# Patient Record
Sex: FEMALE | Race: OTHER | NOT HISPANIC OR LATINO
[De-identification: names, ages, dates, MRNs, and addresses within clinical notes are randomized per-mention and may not be internally consistent; named-entity substitution may affect disease eponyms.]

---

## 2019-09-21 ENCOUNTER — TRANSCRIPTION ENCOUNTER (OUTPATIENT)
Age: 39
End: 2019-09-21

## 2019-11-01 PROBLEM — Z00.00 ENCOUNTER FOR PREVENTIVE HEALTH EXAMINATION: Status: ACTIVE | Noted: 2019-11-01

## 2019-11-02 LAB — CYTOLOGY CVX/VAG DOC THIN PREP: NORMAL

## 2019-11-05 ENCOUNTER — APPOINTMENT (OUTPATIENT)
Dept: OBGYN | Facility: CLINIC | Age: 39
End: 2019-11-05
Payer: COMMERCIAL

## 2019-11-05 DIAGNOSIS — Z86.19 PERSONAL HISTORY OF OTHER INFECTIOUS AND PARASITIC DISEASES: ICD-10-CM

## 2019-11-05 DIAGNOSIS — Z87.59 PERSONAL HISTORY OF OTHER COMPLICATIONS OF PREGNANCY, CHILDBIRTH AND THE PUERPERIUM: ICD-10-CM

## 2019-11-05 DIAGNOSIS — Z86.2 PERSONAL HISTORY OF DISEASES OF THE BLOOD AND BLOOD-FORMING ORGANS AND CERTAIN DISORDERS INVOLVING THE IMMUNE MECHANISM: ICD-10-CM

## 2019-11-05 DIAGNOSIS — Z87.42 PERSONAL HISTORY OF OTHER DISEASES OF THE FEMALE GENITAL TRACT: ICD-10-CM

## 2019-11-05 DIAGNOSIS — D25.9 LEIOMYOMA OF UTERUS, UNSPECIFIED: ICD-10-CM

## 2019-11-05 DIAGNOSIS — N83.291 OTHER OVARIAN CYST, RIGHT SIDE: ICD-10-CM

## 2019-11-05 DIAGNOSIS — Z86.018 PERSONAL HISTORY OF OTHER BENIGN NEOPLASM: ICD-10-CM

## 2019-11-26 ENCOUNTER — APPOINTMENT (OUTPATIENT)
Dept: OBGYN | Facility: CLINIC | Age: 39
End: 2019-11-26
Payer: COMMERCIAL

## 2019-11-26 VITALS
SYSTOLIC BLOOD PRESSURE: 134 MMHG | WEIGHT: 188 LBS | DIASTOLIC BLOOD PRESSURE: 90 MMHG | BODY MASS INDEX: 28.49 KG/M2 | HEIGHT: 68 IN

## 2019-11-26 PROCEDURE — 99396 PREV VISIT EST AGE 40-64: CPT

## 2021-03-22 ENCOUNTER — APPOINTMENT (OUTPATIENT)
Dept: OBGYN | Facility: CLINIC | Age: 41
End: 2021-03-22
Payer: COMMERCIAL

## 2021-03-22 VITALS
TEMPERATURE: 98 F | SYSTOLIC BLOOD PRESSURE: 122 MMHG | DIASTOLIC BLOOD PRESSURE: 76 MMHG | WEIGHT: 175 LBS | BODY MASS INDEX: 26.61 KG/M2

## 2021-03-22 DIAGNOSIS — Z01.419 ENCOUNTER FOR GYNECOLOGICAL EXAMINATION (GENERAL) (ROUTINE) W/OUT ABNORMAL FINDINGS: ICD-10-CM

## 2021-03-22 PROCEDURE — 99396 PREV VISIT EST AGE 40-64: CPT

## 2021-03-22 PROCEDURE — 99072 ADDL SUPL MATRL&STAF TM PHE: CPT

## 2021-03-22 NOTE — HISTORY OF PRESENT ILLNESS
[FreeTextEntry1] : Patient is 40 years old para 3-0-0-3 last menstrual period December 2018 at which time she underwent a supracervical hysterectomy bilateral salpingectomy secondary to symptomatic fibroids.\par She is presently without complaints

## 2021-03-22 NOTE — DISCUSSION/SUMMARY
[FreeTextEntry1] : Pap done\par Self breast exam stressed\par Prescribed bilateral screening mammogram and bilateral breast ultrasound\par Follow-up yearly or as needed

## 2021-06-28 ENCOUNTER — APPOINTMENT (OUTPATIENT)
Dept: BREAST CENTER | Facility: CLINIC | Age: 41
End: 2021-06-28
Payer: COMMERCIAL

## 2021-06-28 VITALS
BODY MASS INDEX: 26.68 KG/M2 | WEIGHT: 170 LBS | DIASTOLIC BLOOD PRESSURE: 80 MMHG | HEIGHT: 67 IN | SYSTOLIC BLOOD PRESSURE: 124 MMHG | TEMPERATURE: 97.9 F

## 2021-06-28 DIAGNOSIS — Z80.3 FAMILY HISTORY OF MALIGNANT NEOPLASM OF BREAST: ICD-10-CM

## 2021-06-28 DIAGNOSIS — Z86.79 PERSONAL HISTORY OF OTHER DISEASES OF THE CIRCULATORY SYSTEM: ICD-10-CM

## 2021-06-28 PROCEDURE — 99203 OFFICE O/P NEW LOW 30 MIN: CPT

## 2021-06-28 PROCEDURE — 99072 ADDL SUPL MATRL&STAF TM PHE: CPT

## 2021-06-28 NOTE — PHYSICAL EXAM
[Normocephalic] : normocephalic [Atraumatic] : atraumatic [EOMI] : extra ocular movement intact [No Supraclavicular Adenopathy] : no supraclavicular adenopathy [No Cervical Adenopathy] : no cervical adenopathy [Examined in the supine and seated position] : examined in the supine and seated position [Symmetrical] : symmetrical [No dominant masses] : no dominant masses in right breast  [No dominant masses] : no dominant masses left breast [No Nipple Retraction] : no left nipple retraction [No Nipple Discharge] : no left nipple discharge [No Axillary Lymphadenopathy] : no left axillary lymphadenopathy [Soft] : abdomen soft [Not Tender] : non-tender [No Edema] : no edema [No Rashes] : no rashes [No Ulceration] : no ulceration [de-identified] : bilateral nondiscrete nodularities palpated throughout both breasts, but no suspicious masses were palpated bilaterally

## 2021-06-28 NOTE — ASSESSMENT
[FreeTextEntry1] : Brandy is a 40 F who presents with b/l BIRADS 3 abnormalities. \par \par On exam, she had b/l nondiscrete nodularities palpated throughout both breasts, but no suspicious abnormalities were palpated b/l. \par \par Her most recent imaging was a b/l screening mammogram and US and subsequent R dx mammogram and b/l US on 4/20/2021 and 4/23/2021 which revealed in her right breast, a well circumscribed 1 cm asymmetry correlating with US finding @1N8, a 7 x 7 x 10 mm nodule, deemed BIRADS 3.  She was also found to have a L breast cluster of cysts @10N5, measuring 9 x 3 x 8 mm, also deemed BIRADS 3. \par \par She will be due for a R dx mammogram and b/l US on 10/23/2021.  This will be scheduled for her today.  I will have her follow up after for a CBE. \par \par We also discussed BIRADS 3 lesions.  These lesions have a 2% chance of harboring malignancy.  There is always an option to obtain a tissue sample with a biopsy to confirm the diagnosis.   The procedure was described in detail including the placement of a tissue marker clip.  The risks of the procedure, including but not limited to bleeding and infection were also explained.  She is not interested in biopsy at this time and agrees to continue with short-term interval imaging.\par \par We discussed dense breasts.  Increasing breast density has been found to increase ones risk of breast cancer, but at this time, there is no clear indication for additional imaging in this setting, as both US and MRI have not been found to improve survival.  One can consider bilateral screening US.  However, out of 1000 women screened, the use of routine US will only identify an additional 3-5 cancers.  The use of US was found to increase the likelihood of undergoing more imaging and more biopsies.  She does have dense breasts.  We have decided to proceed with screening bilateral breast US at this time.  This will be scheduled with her next screening mammogram.\par \par She is otherwise at an average risk for breast cancer and should continue with annual screening mammograms and US. \par \par All of her questions were answered.  She knows to call with any further questions or concerns. \par \par PLAN: \par -R dx mammogram and b/l US on 10/23/2021 \par -f/up after

## 2021-06-28 NOTE — DATA REVIEWED
[FreeTextEntry1] : Procedure(s) \par Accession Number(s)\par Date of Service\par MAMMO SCREENING BILATERAL\par US BREAST\par [id number]\par [id number]\par 4/20/21\par 4/20/21\par  \par  \par  \par     \par OVERALL IMPRESSION: OVERALL BI-RADS 0: INCOMPLETE\par  \par Indeterminant mass in the right breast at 1:00 location 4 cm from the nipple likely corresponding to the mammographic abnormality. Targeted ultrasound is recommended for further evaluation.\par  \par Indeterminant mass in the left breast at 10:00 location 5 cm from the nipple. Targeted ultrasound is recommended for further evaluation.\par  \par An additional imaging exam of both breast(s) is recommended. The patient will be sent a letter to return for additional imaging.\par  \par  \par FINDINGS:\par  \par MAMMO TOMOSYNTHESIS SCREENING BILATERAL, US BREAST COMPLETE BILATERAL\par  \par Clinical Breast Exam: Patient does report clinical breast exam in the last year.\par  \par Clinical Indication: Routine screening.\par  \par Compared to: 03/14/2020, 12/08/2018, and 03/12/2016\par  \par MAMMOGRAM: \par  \par Tomosynthesis and 2D imaging of the breast(s) were performed.  Current study was also evaluated with a computer aided detection (CAD) system.\par  \par Breast density: There are scattered areas of fibroglandular density.\par  \par No significant masses, calcifications, or other findings are seen in the left breast. In the right breast at 12-1 o'clock location there is a mass measuring 1.3 cm.\par  \par Mammo BI-RADS 0: INCOMPLETE - NEED ADDITIONAL IMAGING EVALUATION\par  \par  \par US BREAST COMPLETE BILATERAL\par  \par Ultrasound evaluation was performed including examination of all four quadrants of the breast(s) and the retroareolar region(s).\par  \par In the right breast at 1:00 location 4 cm from the nipple likely corresponding to the mammographic abnormality there is a hypoechoic mass measuring 0.7 x 0.7 x 1.0 cm. Targeted ultrasound is recommended for further evaluation.\par In the left breast at 10:00 location 5 cm from the nipple there is a hypoechoic mass measuring 0.8 x 0.2 x 0.8 cm. Targeted ultrasound is recommended for further evaluation.\par  \par Ultrasound BI-RADS 0: INCOMPLETE\par  \par  \par Electronic Signature: I personally reviewed the images and agree with this report. Final Report: Dictated by  and Signed by Attending Jennifer Bush MD 4/20/2021 10:46 AM\par \par \par Procedure(s) \par Accession Number(s)\par Date of Service\par US BREAST\par MAMMO CALLBACK FROM SCREENING RIGHT\par [id number]\par [id number]\par 4/23/21\par 4/23/21\par  \par  \par  \par     \par OVERALL IMPRESSION: \par  \par Probably benign asymmetry in the right breast as above. Follow-up right breast mammogram and ultrasound is advised.\par  \par Cluster of cysts in the left breast at 10:00 axis. Six-month follow-up targeted breast ultrasound is advised.\par  \par As further detailed above, a 6 month follow-up DIAGNOSTIC imaging exam of both breast(s) is recommended. A letter will be sent to the patient to return for follow up.\par  \par The findings and recommendations were discussed with the patient.\par  \par BI-RADS 3: PROBABLY BENIGN\par  \par  \par FINDINGS:\par  \par MAMMO TOMOSYNTHESIS CALLBACK FROM SCREENING RIGHT, US BREAST\par  \par Clinical Breast Exam: Patient does report clinical breast exam in the last year.\par  \par Clinical Indication: Abnormal Findings on Mammogram. Family history of paternal grandmother with breast cancer.\par  \par Compared to: 04/20/2021, 03/14/2020, 12/08/2018, and 03/12/2016\par  \par MAMMOGRAM: \par  \par Tomosynthesis and 2D imaging of the breast(s) were performed.  Current study was also evaluated with a computer aided detection (CAD) system.\par  \par Breast Density: Heterogeneously dense, which may obscure small masses.\par  \par There is a focal 1 cm well-circumscribed asymmetry in the right breast at 1:00 axis which likely correlates with ultrasound findings. No other significant masses, calcifications, or other findings are seen in the right breast.\par  \par US BREAST LIMITED BILATERAL\par  \par Color flow, Gray scale and real-time ultrasound of both breasts was performed and targeted to the area(s) of interest.\par  \par  \par There is a nodule in the right breast at 1:00 axis at 8 cm from nipple (previously labeled as being at 1:00 axis at 4 cm from the nipple) measuring 7 x 7 x 10 mm. It likely correlates with the focal asymmetry visualized mammographically. A six-month follow-up diagnostic mammogram and ultrasound is advised.\par  \par There is a cluster of cysts visualized in left breast at 10:00 axis at 5 cm from nipple measuring 9 x 3 x 8 mm. Six-month follow-up targeted breast ultrasound is advised.\par  \par No other findings were seen sonographically in either imaged breast.\par  \par Electronic Signature: I personally reviewed the images and agree with this report. Final Report: Dictated by  and Signed by Attending Layne Garcia MD 4/23/2021 12:57 PM

## 2021-06-28 NOTE — REVIEW OF SYSTEMS
[Abn Vaginal Bleeding] : unexplained vaginal bleeding [As Noted in HPI] : as noted in HPI [Negative] : Heme/Lymph [Fever] : no fever [Chills] : no chills [Skin Lesions] : no skin lesions [Skin Wound] : no skin wound [Breast Pain] : no breast pain [Breast Lump] : no breast lump [Hot Flashes] : no hot flashes

## 2021-06-28 NOTE — HISTORY OF PRESENT ILLNESS
[FreeTextEntry1] : Brandy is a 40 F who presents with b/l BIRADS 3 abnormalities. \par \par She has no breast related complaints at this time.  She denies any breast pain, has not palpated any new palpable masses in either breast and denies any nipple discharge or retraction.  \par \par Her work up was as follows: \par 2021 -- b/l screening mammogram and US \par -scattered areas of fibroglandular densities\par -no suspicious mass, microcalcifications or architectural distortion in L \par -R: @12-1, mass measuring 1.3 cm \par B/l US \par RIGHT \par -@1N4, hypoechoic mass, measures 7 x 7 x 10 mm \par LEFT: \par -@10N15, hypoechoic mass, measures 8 x 2 x 8 mm \par BIRADS 0 \par \par 2021 -- R dx mammogram and b/l US \par -heterogenously dense breasts\par -focal 1 cm well circumscribed asymmetry in R @1:00, likely correlates with US \par -no suspicious mass, microcalcifications or architectural distortion in R \par b/l US \par RIGHT: \par -@1N8, previously labeled @1N4, 7 x 7 x 10 mm nodule --> BIRADS 3 \par LEFT: \par -@10N5, cluster of cysts, measures 9 x 3 x 8 mm --> BIRADS 3 \par BIRADS 3 \par \par HISTORICAL RISK FACTORS: \par -no prior breast biopsies or surgeries \par -family history of breast cancer in a paternal grandmother at age 80 \par -, age at first live birth was 15 \par -prior OCP use, stopped in  \par -s/p hysterectomy in

## 2021-06-28 NOTE — PAST MEDICAL HISTORY
[Surgical Menopause] : The patient is in surgical menopause [Menarche Age ____] : age at menarche was [unfilled] [History of Hormone Replacement Treatment] : has no history of hormone replacement treatment [Total Preg ___] : G[unfilled] [Live Births ___] : P[unfilled]  [Age At Live Birth ___] : Age at live birth: [unfilled] [FreeTextEntry5] : s/p hysterectomy in  2017  [FreeTextEntry6] : denies [FreeTextEntry7] : yes in past, stopped in 2012  [FreeTextEntry8] : yes x 4 months

## 2021-10-28 ENCOUNTER — APPOINTMENT (OUTPATIENT)
Dept: BREAST CENTER | Facility: CLINIC | Age: 41
End: 2021-10-28

## 2021-11-16 ENCOUNTER — NON-APPOINTMENT (OUTPATIENT)
Age: 41
End: 2021-11-16

## 2021-12-03 ENCOUNTER — TRANSCRIPTION ENCOUNTER (OUTPATIENT)
Age: 41
End: 2021-12-03

## 2021-12-09 ENCOUNTER — APPOINTMENT (OUTPATIENT)
Dept: BREAST CENTER | Facility: CLINIC | Age: 41
End: 2021-12-09

## 2022-05-10 ENCOUNTER — APPOINTMENT (OUTPATIENT)
Dept: OBGYN | Facility: CLINIC | Age: 42
End: 2022-05-10
Payer: COMMERCIAL

## 2022-05-10 VITALS — WEIGHT: 181 LBS | DIASTOLIC BLOOD PRESSURE: 80 MMHG | BODY MASS INDEX: 28.35 KG/M2 | SYSTOLIC BLOOD PRESSURE: 122 MMHG

## 2022-05-10 DIAGNOSIS — Z01.419 ENCOUNTER FOR GYNECOLOGICAL EXAMINATION (GENERAL) (ROUTINE) W/OUT ABNORMAL FINDINGS: ICD-10-CM

## 2022-05-10 PROCEDURE — 99396 PREV VISIT EST AGE 40-64: CPT

## 2022-05-10 NOTE — HISTORY OF PRESENT ILLNESS
[FreeTextEntry1] : Patient is 41 years old para 3-0-0-3 last menstrual period December 2018 at which time she underwent a supracervical hysterectomy bilateral salpingectomy secondary to symptomatic fibroids.\par She is presently without complaints.

## 2023-06-01 ENCOUNTER — APPOINTMENT (OUTPATIENT)
Dept: OBGYN | Facility: CLINIC | Age: 43
End: 2023-06-01
Payer: COMMERCIAL

## 2023-06-01 VITALS
BODY MASS INDEX: 30.29 KG/M2 | HEIGHT: 67 IN | DIASTOLIC BLOOD PRESSURE: 76 MMHG | SYSTOLIC BLOOD PRESSURE: 120 MMHG | WEIGHT: 193 LBS

## 2023-06-01 DIAGNOSIS — Z78.9 OTHER SPECIFIED HEALTH STATUS: ICD-10-CM

## 2023-06-01 DIAGNOSIS — Z01.419 ENCOUNTER FOR GYNECOLOGICAL EXAMINATION (GENERAL) (ROUTINE) W/OUT ABNORMAL FINDINGS: ICD-10-CM

## 2023-06-01 PROCEDURE — 99396 PREV VISIT EST AGE 40-64: CPT

## 2023-06-01 NOTE — REASON FOR VISIT
Requested Prescriptions     Pending Prescriptions Disp Refills    lisinopril (PRINIVIL, ZESTRIL) 20 mg tablet 90 Tab 3     Sig: Take 1 Tab by mouth daily. [Annual] : an annual visit.

## 2023-06-01 NOTE — HISTORY OF PRESENT ILLNESS
[FreeTextEntry1] : Patient is 42 years old para 3-0-0-3 last menstrual period December 2018 at which time she underwent a supracervical hysterectomy bilateral salpingectomy secondary to symptomatic fibroids.\par She is presently without complaints.

## 2023-08-31 ENCOUNTER — APPOINTMENT (OUTPATIENT)
Dept: BREAST CENTER | Facility: CLINIC | Age: 43
End: 2023-08-31
Payer: COMMERCIAL

## 2023-08-31 VITALS
BODY MASS INDEX: 30.13 KG/M2 | SYSTOLIC BLOOD PRESSURE: 121 MMHG | WEIGHT: 192 LBS | HEIGHT: 67 IN | DIASTOLIC BLOOD PRESSURE: 76 MMHG

## 2023-08-31 DIAGNOSIS — R92.8 OTHER ABNORMAL AND INCONCLUSIVE FINDINGS ON DIAGNOSTIC IMAGING OF BREAST: ICD-10-CM

## 2023-08-31 DIAGNOSIS — N63.10 UNSPECIFIED LUMP IN THE RIGHT BREAST, UNSPECIFIED QUADRANT: ICD-10-CM

## 2023-08-31 DIAGNOSIS — N63.20 UNSPECIFIED LUMP IN THE LEFT BREAST, UNSPECIFIED QUADRANT: ICD-10-CM

## 2023-08-31 PROCEDURE — 99214 OFFICE O/P EST MOD 30 MIN: CPT

## 2023-08-31 NOTE — PHYSICAL EXAM
[Normocephalic] : normocephalic [EOMI] : extra ocular movement intact [No Supraclavicular Adenopathy] : no supraclavicular adenopathy [No Cervical Adenopathy] : no cervical adenopathy [Examined in the supine and seated position] : examined in the supine and seated position [No dominant masses] : no dominant masses in right breast  [No dominant masses] : no dominant masses left breast [No Nipple Retraction] : no left nipple retraction [No Nipple Discharge] : no left nipple discharge [Breast Nipple Inversion] : nipples not inverted [Breast Nipple Retraction] : nipples not retracted [No Axillary Lymphadenopathy] : no left axillary lymphadenopathy [No Rashes] : no rashes [No Ulceration] : no ulceration [de-identified] : Nl respirations

## 2023-08-31 NOTE — ASSESSMENT
[FreeTextEntry1] : Patient is a 42F with BIRADS 3 imaging.  She initially underwent imaging in 4/2021 that was BIRADS 3 with bilateral breast masses, likely cysts. She most recently had bilateral mmg/us in 6/2023 which showed bilateral stable breast masses (BIRADS 3).  I had a lengthy discussion with this patient regarding diagnostic results, impressions, recommendations, risks and benefits.  We discussed breast cysts. They are not pre-malignant nor do they have malignant potential and are hormonally influenced. They may grow or shrink in size as well as resolve spontaneously and there is usually no intervention unless they are symptomatic. In several large studies, patients with breast cysts and a positive family history had a higher relative risk of breast cancer (from 1.5 to 3).  I explained to the patient the BIRADS system of grading and that her findings fall into category 3. Category 3 carries a less than 5% risk of malignancy. She can choose to follow up imaging. She is also aware that she can also choose to biopsy the lesion if she wishes to. I briefly discussed the procedure will be performed by a breast imaging specialist, and will include numbing with local anesthesia, followed by a small biopsy marker placement afterwards, which is usually not bothersome, and is not recognized by radiofrequency devices.  She understands her options and wants to proceed with imaging follow up.  All questions and concerns were answered in detail.  Patient is for bilateral US in 12/2023 for BIRADS 3 imaging.  She is to follow up after imaging ,pending any interval changes.  Total time spent on encounter was greater than 30 minutes, which included face to face time with the patient, performing an exam, reviewing previous medical records including imaging/ pathology, documenting in patient record and coordinating care/imaging. Greater than 50% of the encounter was spent on counseling and coordination of her breast issue.

## 2023-08-31 NOTE — PAST MEDICAL HISTORY
[Surgical Menopause] : The patient is in surgical menopause [Menarche Age ____] : age at menarche was [unfilled] [Total Preg ___] : G[unfilled] [Live Births ___] : P[unfilled]  [Age At Live Birth ___] : Age at live birth: [unfilled] [History of Hormone Replacement Treatment] : has no history of hormone replacement treatment [FreeTextEntry5] : s/p hysterectomy in  2017  [FreeTextEntry6] : denies [FreeTextEntry7] : yes in past, stopped in 2012  [FreeTextEntry8] : yes x 4 months

## 2023-08-31 NOTE — DATA REVIEWED
[FreeTextEntry1] : Jun 20, 2023	   There is no mammographic evidence of malignancy. Stable bilateral sonographic findings. Both lesions can be reevaluated sonographically in 12 months.   A 12 month follow-up of both breast(s) is recommended.    A letter will be sent to the patient to return for follow up.   The findings and recommendations were communicated to the patient.   BI-RADS 3: PROBABLY BENIGN     MAMMO TOMOSYNTHESIS DIAGNOSTIC BILATERAL, US BREAST COMPLETE BILATERAL   Clinical Breast Exam: Patient does report clinical breast exam in the last year.   Clinical Indication: Family history of paternal grandmother with breast cancer. Patient has history of benign biopsy/biopsies. Patient in for annual follow up. Patient has had a benign surgical excision in the left breast.   Compared to: 05/19/2022, 04/20/2021, 03/14/2020   MAMMOGRAM:    Tomosynthesis 3D and 2D imaging of the breast(s) were performed.  Current study was also evaluated with a computer aided detection (CAD) system.   Breast Density: Heterogeneously dense, which may obscure small masses.   No suspicious masses, calcifications, or other findings are seen.   US BREAST COMPLETE BILATERAL   Ultrasound evaluation was performed including examination of all four quadrants of the breast(s) and the retroareolar regions.   Color flow, Gray scale and real-time ultrasound of both breasts was performed.    Stable bilateral sonographic findings. Both lesions can be reevaluated sonographically in 12 months. No suspicious abnormalities were seen sonographically.

## 2023-08-31 NOTE — HISTORY OF PRESENT ILLNESS
[FreeTextEntry1] : Brandy is a 42 F who presents with b/l BIRADS 3 abnormalities.   HISTORICAL RISK FACTORS:  -family history of breast cancer in a paternal grandmother at age 80  -, age at first live birth was 15  -prior OCP use, stopped in   -s/p hysterectomy in 2017   Her work up was as follows:  2021 -- b/l screening mammogram and US  -scattered areas of fibroglandular densities -no suspicious mass, microcalcifications or architectural distortion in L  -R: @12-1, mass measuring 1.3 cm  B/l US  RIGHT  -@1N4, hypoechoic mass, measures 7 x 7 x 10 mm  LEFT:  -@10N15, hypoechoic mass, measures 8 x 2 x 8 mm  BIRADS 0   2021 -- R dx mammogram and b/l US --> BIRADS 3 -heterogenously dense breasts -focal 1 cm well circumscribed asymmetry in R @1:00, likely correlates with US  -no suspicious mass, microcalcifications or architectural distortion in R  b/l US  RIGHT:  -@1N8, previously labeled @1N4, 7 x 7 x 10 mm nodule --> BIRADS 3  LEFT:  -@10N5, cluster of cysts, measures 9 x 3 x 8 mm --> BIRADS 3   Her most recent imaging is as follows:  23 b/l dx mammo and US-->BIRADS 3 -Breast Density: Heterogeneously dense, -Stable bilateral sonographic findings.  Denies any current complaints. No new changes to her breasts.

## 2024-07-24 ENCOUNTER — APPOINTMENT (OUTPATIENT)
Dept: OBGYN | Facility: CLINIC | Age: 44
End: 2024-07-24
Payer: COMMERCIAL

## 2024-07-24 VITALS — DIASTOLIC BLOOD PRESSURE: 73 MMHG | HEART RATE: 75 BPM | SYSTOLIC BLOOD PRESSURE: 138 MMHG

## 2024-07-24 DIAGNOSIS — Z01.419 ENCOUNTER FOR GYNECOLOGICAL EXAMINATION (GENERAL) (ROUTINE) W/OUT ABNORMAL FINDINGS: ICD-10-CM

## 2024-07-24 PROCEDURE — 99459 PELVIC EXAMINATION: CPT

## 2024-07-24 PROCEDURE — 99396 PREV VISIT EST AGE 40-64: CPT | Mod: 25

## 2024-07-24 NOTE — PHYSICAL EXAM
[Chaperone Present] : A chaperone was present in the examining room during all aspects of the physical examination [[id number]] : A chaperone was present during the pelvic exam.

## 2024-08-12 ENCOUNTER — APPOINTMENT (OUTPATIENT)
Dept: OBGYN | Facility: CLINIC | Age: 44
End: 2024-08-12
Payer: COMMERCIAL

## 2024-08-12 VITALS
SYSTOLIC BLOOD PRESSURE: 143 MMHG | DIASTOLIC BLOOD PRESSURE: 92 MMHG | HEIGHT: 67 IN | BODY MASS INDEX: 30.13 KG/M2 | HEART RATE: 78 BPM | WEIGHT: 192 LBS

## 2024-08-12 DIAGNOSIS — R87.615 UNSATISFACTORY CYTOLOGIC SMEAR OF CERVIX: ICD-10-CM

## 2024-08-12 PROCEDURE — XXXXX: CPT

## 2024-08-12 NOTE — HISTORY OF PRESENT ILLNESS
[FreeTextEntry1] : Patient is 43 years old para 3-0-2-3 last menstrual period 2018 She has a history of supracervical hysterectomy Pap performed on July 24, 2024 noted endocervical/transformation zone component absent Patient returns for repeat Pap

## 2024-08-12 NOTE — PHYSICAL EXAM
[Chaperone Present] : A chaperone was present in the examining room during all aspects of the physical examination [FreeTextEntry2] : SH [Appropriately responsive] : appropriately responsive [Alert] : alert [No Acute Distress] : no acute distress [No Lymphadenopathy] : no lymphadenopathy [Regular Rate Rhythm] : regular rate rhythm [No Murmurs] : no murmurs [Clear to Auscultation B/L] : clear to auscultation bilaterally [Soft] : soft [Non-tender] : non-tender [Non-distended] : non-distended [No HSM] : No HSM [No Lesions] : no lesions [No Mass] : no mass [Oriented x3] : oriented x3 [Labia Majora] : normal [Labia Minora] : normal [Normal] : normal [Absent] : absent [Uterine Adnexae] : non-palpable

## 2024-09-16 ENCOUNTER — APPOINTMENT (OUTPATIENT)
Dept: BREAST CENTER | Facility: CLINIC | Age: 44
End: 2024-09-16
Payer: COMMERCIAL

## 2024-09-16 VITALS
DIASTOLIC BLOOD PRESSURE: 91 MMHG | SYSTOLIC BLOOD PRESSURE: 160 MMHG | WEIGHT: 192 LBS | BODY MASS INDEX: 30.13 KG/M2 | HEIGHT: 67 IN

## 2024-09-16 DIAGNOSIS — N60.11 DIFFUSE CYSTIC MASTOPATHY OF RIGHT BREAST: ICD-10-CM

## 2024-09-16 DIAGNOSIS — N63.20 UNSPECIFIED LUMP IN THE LEFT BREAST, UNSPECIFIED QUADRANT: ICD-10-CM

## 2024-09-16 DIAGNOSIS — N63.10 UNSPECIFIED LUMP IN THE RIGHT BREAST, UNSPECIFIED QUADRANT: ICD-10-CM

## 2024-09-16 DIAGNOSIS — N60.12 DIFFUSE CYSTIC MASTOPATHY OF RIGHT BREAST: ICD-10-CM

## 2024-09-16 PROCEDURE — 99214 OFFICE O/P EST MOD 30 MIN: CPT

## 2024-09-17 PROBLEM — N60.11 BILATERAL FIBROCYSTIC BREAST CHANGES: Status: ACTIVE | Noted: 2024-09-17

## 2024-09-17 NOTE — HISTORY OF PRESENT ILLNESS
[FreeTextEntry1] : Brandy is a 43 F who previous BIRADs 3 findings, now deemed BIRADS 2.  HISTORICAL RISK FACTORS:  -family history of breast cancer in a paternal grandmother at age 80  -, age at first live birth was 15  -prior OCP use, stopped in   -s/p hysterectomy in 2017   Her work up was as follows:  2021 -- b/l screening mammogram and US  -scattered areas of fibroglandular densities -no suspicious mass, microcalcifications or architectural distortion in L  -R: @12-1, mass measuring 1.3 cm  B/l US  RIGHT  -@1N4, hypoechoic mass, measures 7 x 7 x 10 mm  LEFT:  -@10N15, hypoechoic mass, measures 8 x 2 x 8 mm  BIRADS 0   2021 -- R dx mammogram and b/l US --> BIRADS 3 -heterogenously dense breasts -focal 1 cm well circumscribed asymmetry in R @1:00, likely correlates with US  -no suspicious mass, microcalcifications or architectural distortion in R  b/l US  RIGHT:  -@1N8, previously labeled @1N4, 7 x 7 x 10 mm nodule --> BIRADS 3  LEFT:  -@10N5, cluster of cysts, measures 9 x 3 x 8 mm --> BIRADS 3   Her most recent imaging is as follows:  23 b/l dx mammo and US-->BIRADS 3 -Breast Density: Heterogeneously dense, -Stable bilateral sonographic findings.  Denies any current complaints. No new changes to her breasts.   Most recent imaging: B/L Dx Mammo & Sono - 2024: -Breast Density: There are scattered areas of fibroglandular density. -No suspicious masses, calcifications, or other findings are seen. US BREAST COMPLETE BILATERAL -No suspicious abnormalities were seen sonographically.  -In the right breast at 1:00 location 4 cm from nipple there is a stable benign mass measuring 1.0 cm.  -The previously seen mass in the right breast at 11:00 location 9 cm from the nipple is not visualized on today's exam.  -In the left breast at 10:00 location 5 cm from the nipple there is a stable benign mass measuring 0.7 cm. BI-RADS 2: BENIGN  Denies any current complaints. No new changes to her breasts.

## 2024-09-17 NOTE — ASSESSMENT
[FreeTextEntry1] : Patient is a 42F with BIRADS 3 imaging, now BIRADS 2.  She initially underwent imaging in 4/2021 that was BIRADS 3 with bilateral breast masses, likely cysts. She most recently had bilateral mmg/us in 6/2023 which showed bilateral stable breast masses (BIRADS 3).  She had bilateral mmg/us in 8/2024 which showed that the previously seen right breast mass is no longer seen (BIRADS 2).  I had a lengthy discussion with this patient regarding diagnostic results, impressions, recommendations, risks and benefits.  We discussed breast cysts. They are not pre-malignant nor do they have malignant potential and are hormonally influenced. They may grow or shrink in size as well as resolve spontaneously and there is usually no intervention unless they are symptomatic. In several large studies, patients with breast cysts and a positive family history had a higher relative risk of breast cancer (from 1.5 to 3).  We discussed that the previously seen right breast mass is no longer seen and that she will be due for annual imaging.  We discussed breast density. Increasing breast density has been found to increase ones risk of breast cancer, but at this time, there is no clear indication for additional imaging in this setting, as both US and MRI have not been found to improve survival. One can consider bilateral screening US. However, out of 1000 women screened, the use of routine US will only identify an additional 3-5 cancers. The use of US was found to increase the likelihood of undergoing more imaging and more biopsies. She wishes to proceed with screening ultrasounds at this time.  All questions and concerns were answered in detail.  Patient is for bilateral mmg/us in 8/2025.  She is to follow up after imaging, pending any interval changes.  Total time spent on encounter was greater than 30 minutes, which included face to face time with the patient, performing an exam, reviewing previous medical records including imaging/ pathology, documenting in patient record and coordinating care/imaging. Greater than 50% of the encounter was spent on counseling and coordination of her breast issue.

## 2024-09-17 NOTE — PHYSICAL EXAM
[Normocephalic] : normocephalic [EOMI] : extra ocular movement intact [No Supraclavicular Adenopathy] : no supraclavicular adenopathy [No Cervical Adenopathy] : no cervical adenopathy [Examined in the supine and seated position] : examined in the supine and seated position [No dominant masses] : no dominant masses in right breast  [No dominant masses] : no dominant masses left breast [No Nipple Retraction] : no left nipple retraction [No Nipple Discharge] : no left nipple discharge [No Axillary Lymphadenopathy] : no left axillary lymphadenopathy [No Rashes] : no rashes [No Ulceration] : no ulceration [de-identified] : NL respirations

## 2024-09-17 NOTE — REASON FOR VISIT
[Follow-Up: _____] : a [unfilled] follow-up visit [FreeTextEntry1] : h/o BIRDAS-3 imaging - now deemed BIRADS-2.

## 2024-09-17 NOTE — PHYSICAL EXAM
[Normocephalic] : normocephalic [EOMI] : extra ocular movement intact [No Supraclavicular Adenopathy] : no supraclavicular adenopathy [No Cervical Adenopathy] : no cervical adenopathy [Examined in the supine and seated position] : examined in the supine and seated position [No dominant masses] : no dominant masses in right breast  [No dominant masses] : no dominant masses left breast [No Nipple Retraction] : no left nipple retraction [No Nipple Discharge] : no left nipple discharge [No Axillary Lymphadenopathy] : no left axillary lymphadenopathy [No Rashes] : no rashes [No Ulceration] : no ulceration [de-identified] : NL respirations

## 2024-09-17 NOTE — DATA REVIEWED
[FreeTextEntry1] : B/L Dx Mammo & Sono - 08/22/2024: MAMMOGRAM:    Tomosynthesis 3D and 2D imaging of the breast(s) were performed.  Current study was also evaluated with a computer aided detection (CAD) system.   Breast Density: There are scattered areas of fibroglandular density.   No suspicious masses, calcifications, or other findings are seen.   US BREAST COMPLETE BILATERAL   Ultrasound evaluation was performed including examination of all four quadrants of the breast(s) and the retroareolar regions.   Color flow, Gray scale and real-time ultrasound of both breasts was performed.    No suspicious abnormalities were seen sonographically. In the right breast at 1:00 location 4 cm from nipple there is a stable benign mass measuring 1.0 cm. The previously seen mass in the right breast at 11:00 location 9 cm from the nipple is not visualized on today's exam. In the left breast at 10:00 location 5 cm from the nipple there is a stable benign mass measuring 0.7 cm.  OVERALL IMPRESSION:    No imaging evidence of malignancy on the current exam(s).   A 1 year screening mammogram of both breast(s) is recommended.   The patient will be sent a normal letter.   BI-RADS 2: BENIGN

## 2024-09-17 NOTE — HISTORY OF PRESENT ILLNESS
[FreeTextEntry1] : Brnady is a 43 F who previous BIRADs 3 findings, now deemed BIRADS 2.  HISTORICAL RISK FACTORS:  -family history of breast cancer in a paternal grandmother at age 80  -, age at first live birth was 15  -prior OCP use, stopped in   -s/p hysterectomy in 2017   Her work up was as follows:  2021 -- b/l screening mammogram and US  -scattered areas of fibroglandular densities -no suspicious mass, microcalcifications or architectural distortion in L  -R: @12-1, mass measuring 1.3 cm  B/l US  RIGHT  -@1N4, hypoechoic mass, measures 7 x 7 x 10 mm  LEFT:  -@10N15, hypoechoic mass, measures 8 x 2 x 8 mm  BIRADS 0   2021 -- R dx mammogram and b/l US --> BIRADS 3 -heterogenously dense breasts -focal 1 cm well circumscribed asymmetry in R @1:00, likely correlates with US  -no suspicious mass, microcalcifications or architectural distortion in R  b/l US  RIGHT:  -@1N8, previously labeled @1N4, 7 x 7 x 10 mm nodule --> BIRADS 3  LEFT:  -@10N5, cluster of cysts, measures 9 x 3 x 8 mm --> BIRADS 3   Her most recent imaging is as follows:  21 b/l dx mammo and US-->BIRADS 3 -Breast Density: Heterogeneously dense, -Stable bilateral sonographic findings.  Denies any current complaints. No new changes to her breasts.   Most recent imaging: B/L Dx Mammo & Sono - 2024: -Breast Density: There are scattered areas of fibroglandular density. -No suspicious masses, calcifications, or other findings are seen. US BREAST COMPLETE BILATERAL -No suspicious abnormalities were seen sonographically.  -In the right breast at 1:00 location 4 cm from nipple there is a stable benign mass measuring 1.0 cm.  -The previously seen mass in the right breast at 11:00 location 9 cm from the nipple is not visualized on today's exam.  -In the left breast at 10:00 location 5 cm from the nipple there is a stable benign mass measuring 0.7 cm. BI-RADS 2: BENIGN  Denies any current complaints. No new changes to her breasts.

## 2025-07-28 ENCOUNTER — APPOINTMENT (OUTPATIENT)
Dept: OBGYN | Facility: CLINIC | Age: 45
End: 2025-07-28
Payer: COMMERCIAL

## 2025-07-28 VITALS
HEIGHT: 67 IN | WEIGHT: 169 LBS | DIASTOLIC BLOOD PRESSURE: 80 MMHG | BODY MASS INDEX: 26.53 KG/M2 | SYSTOLIC BLOOD PRESSURE: 140 MMHG

## 2025-07-28 DIAGNOSIS — Z01.419 ENCOUNTER FOR GYNECOLOGICAL EXAMINATION (GENERAL) (ROUTINE) W/OUT ABNORMAL FINDINGS: ICD-10-CM

## 2025-07-28 PROCEDURE — 99459 PELVIC EXAMINATION: CPT

## 2025-07-28 PROCEDURE — 99396 PREV VISIT EST AGE 40-64: CPT | Mod: 25

## 2025-08-04 DIAGNOSIS — N88.2 STRICTURE AND STENOSIS OF CERVIX UTERI: ICD-10-CM

## 2025-08-04 RX ORDER — MISOPROSTOL 200 UG/1
200 TABLET ORAL
Qty: 2 | Refills: 0 | Status: ACTIVE | COMMUNITY
Start: 2025-08-04 | End: 1900-01-01

## 2025-08-08 ENCOUNTER — NON-APPOINTMENT (OUTPATIENT)
Age: 45
End: 2025-08-08

## 2025-08-08 ENCOUNTER — APPOINTMENT (OUTPATIENT)
Dept: OBGYN | Facility: CLINIC | Age: 45
End: 2025-08-08
Payer: COMMERCIAL

## 2025-08-08 DIAGNOSIS — R87.615 UNSATISFACTORY CYTOLOGIC SMEAR OF CERVIX: ICD-10-CM

## 2025-08-08 PROCEDURE — ZZZZZ: CPT
